# Patient Record
Sex: MALE | Race: WHITE | NOT HISPANIC OR LATINO | Employment: OTHER | URBAN - METROPOLITAN AREA
[De-identification: names, ages, dates, MRNs, and addresses within clinical notes are randomized per-mention and may not be internally consistent; named-entity substitution may affect disease eponyms.]

---

## 2019-02-03 ENCOUNTER — HOSPITAL ENCOUNTER (EMERGENCY)
Facility: HOSPITAL | Age: 29
Discharge: HOME | End: 2019-02-03
Attending: EMERGENCY MEDICINE | Admitting: EMERGENCY MEDICINE
Payer: MEDICAID

## 2019-02-03 VITALS
RESPIRATION RATE: 16 BRPM | TEMPERATURE: 98.2 F | DIASTOLIC BLOOD PRESSURE: 90 MMHG | WEIGHT: 225.31 LBS | OXYGEN SATURATION: 97 % | SYSTOLIC BLOOD PRESSURE: 147 MMHG | HEART RATE: 83 BPM

## 2019-02-03 DIAGNOSIS — R11.10 VOMITING: ICD-10-CM

## 2019-02-03 DIAGNOSIS — R23.3 PETECHIAL RASH: Primary | ICD-10-CM

## 2019-02-03 DIAGNOSIS — M25.50 JOINT PAIN: ICD-10-CM

## 2019-02-03 LAB
ALBUMIN SERPL BCP-MCNC: 4.4 G/DL (ref 3.5–5)
ALP SERPL-CCNC: 73 U/L (ref 46–116)
ALT SERPL W P-5'-P-CCNC: 55 U/L (ref 12–78)
ANION GAP SERPL CALCULATED.3IONS-SCNC: 11 MMOL/L (ref 4–13)
APTT PPP: 22 SECONDS (ref 26–38)
AST SERPL W P-5'-P-CCNC: 46 U/L (ref 5–45)
BASOPHILS # BLD AUTO: 0.05 THOUSANDS/ΜL (ref 0–0.1)
BASOPHILS NFR BLD AUTO: 1 % (ref 0–1)
BILIRUB DIRECT SERPL-MCNC: 0.2 MG/DL (ref 0–0.2)
BILIRUB SERPL-MCNC: 0.9 MG/DL (ref 0.2–1)
BUN SERPL-MCNC: 15 MG/DL (ref 5–25)
CALCIUM SERPL-MCNC: 9.1 MG/DL (ref 8.3–10.1)
CHLORIDE SERPL-SCNC: 101 MMOL/L (ref 100–108)
CO2 SERPL-SCNC: 28 MMOL/L (ref 21–32)
CREAT SERPL-MCNC: 1.1 MG/DL (ref 0.6–1.3)
EOSINOPHIL # BLD AUTO: 0.02 THOUSAND/ΜL (ref 0–0.61)
EOSINOPHIL NFR BLD AUTO: 0 % (ref 0–6)
ERYTHROCYTE [DISTWIDTH] IN BLOOD BY AUTOMATED COUNT: 12 % (ref 11.6–15.1)
GFR SERPL CREATININE-BSD FRML MDRD: 92 ML/MIN/1.73SQ M
GLUCOSE SERPL-MCNC: 121 MG/DL (ref 65–140)
HCT VFR BLD AUTO: 45 % (ref 36.5–49.3)
HGB BLD-MCNC: 15.5 G/DL (ref 12–17)
IMM GRANULOCYTES # BLD AUTO: 0.01 THOUSAND/UL (ref 0–0.2)
IMM GRANULOCYTES NFR BLD AUTO: 0 % (ref 0–2)
INR PPP: 0.93 (ref 0.86–1.17)
LACTATE SERPL-SCNC: 1.8 MMOL/L (ref 0.5–2)
LIPASE SERPL-CCNC: 111 U/L (ref 73–393)
LYMPHOCYTES # BLD AUTO: 1.67 THOUSANDS/ΜL (ref 0.6–4.47)
LYMPHOCYTES NFR BLD AUTO: 23 % (ref 14–44)
MCH RBC QN AUTO: 30.1 PG (ref 26.8–34.3)
MCHC RBC AUTO-ENTMCNC: 34.4 G/DL (ref 31.4–37.4)
MCV RBC AUTO: 87 FL (ref 82–98)
MONOCYTES # BLD AUTO: 0.49 THOUSAND/ΜL (ref 0.17–1.22)
MONOCYTES NFR BLD AUTO: 7 % (ref 4–12)
NEUTROPHILS # BLD AUTO: 5.15 THOUSANDS/ΜL (ref 1.85–7.62)
NEUTS SEG NFR BLD AUTO: 69 % (ref 43–75)
NRBC BLD AUTO-RTO: 0 /100 WBCS
PLATELET # BLD AUTO: 183 THOUSANDS/UL (ref 149–390)
PMV BLD AUTO: 10.4 FL (ref 8.9–12.7)
POTASSIUM SERPL-SCNC: 4 MMOL/L (ref 3.5–5.3)
PROT SERPL-MCNC: 7.7 G/DL (ref 6.4–8.2)
PROTHROMBIN TIME: 12.4 SECONDS (ref 11.8–14.2)
RBC # BLD AUTO: 5.15 MILLION/UL (ref 3.88–5.62)
SODIUM SERPL-SCNC: 140 MMOL/L (ref 136–145)
WBC # BLD AUTO: 7.39 THOUSAND/UL (ref 4.31–10.16)

## 2019-02-03 PROCEDURE — 85025 COMPLETE CBC W/AUTO DIFF WBC: CPT | Performed by: PHYSICIAN ASSISTANT

## 2019-02-03 PROCEDURE — 96361 HYDRATE IV INFUSION ADD-ON: CPT

## 2019-02-03 PROCEDURE — 85610 PROTHROMBIN TIME: CPT | Performed by: PHYSICIAN ASSISTANT

## 2019-02-03 PROCEDURE — 85730 THROMBOPLASTIN TIME PARTIAL: CPT | Performed by: PHYSICIAN ASSISTANT

## 2019-02-03 PROCEDURE — 36415 COLL VENOUS BLD VENIPUNCTURE: CPT | Performed by: PHYSICIAN ASSISTANT

## 2019-02-03 PROCEDURE — 96375 TX/PRO/DX INJ NEW DRUG ADDON: CPT

## 2019-02-03 PROCEDURE — 80048 BASIC METABOLIC PNL TOTAL CA: CPT | Performed by: PHYSICIAN ASSISTANT

## 2019-02-03 PROCEDURE — 80076 HEPATIC FUNCTION PANEL: CPT | Performed by: PHYSICIAN ASSISTANT

## 2019-02-03 PROCEDURE — 83690 ASSAY OF LIPASE: CPT | Performed by: PHYSICIAN ASSISTANT

## 2019-02-03 PROCEDURE — 96374 THER/PROPH/DIAG INJ IV PUSH: CPT

## 2019-02-03 PROCEDURE — 99283 EMERGENCY DEPT VISIT LOW MDM: CPT

## 2019-02-03 PROCEDURE — 83605 ASSAY OF LACTIC ACID: CPT | Performed by: PHYSICIAN ASSISTANT

## 2019-02-03 RX ORDER — METHYLPREDNISOLONE SODIUM SUCCINATE 125 MG/2ML
125 INJECTION, POWDER, LYOPHILIZED, FOR SOLUTION INTRAMUSCULAR; INTRAVENOUS ONCE
Status: COMPLETED | OUTPATIENT
Start: 2019-02-03 | End: 2019-02-03

## 2019-02-03 RX ORDER — ONDANSETRON 2 MG/ML
4 INJECTION INTRAMUSCULAR; INTRAVENOUS ONCE
Status: COMPLETED | OUTPATIENT
Start: 2019-02-03 | End: 2019-02-03

## 2019-02-03 RX ORDER — ONDANSETRON 4 MG/1
4 TABLET, ORALLY DISINTEGRATING ORAL EVERY 8 HOURS SCHEDULED
Qty: 21 TABLET | Refills: 0 | Status: SHIPPED | OUTPATIENT
Start: 2019-02-03 | End: 2019-02-10

## 2019-02-03 RX ADMIN — METHYLPREDNISOLONE SODIUM SUCCINATE 125 MG: 125 INJECTION, POWDER, FOR SOLUTION INTRAMUSCULAR; INTRAVENOUS at 17:33

## 2019-02-03 RX ADMIN — SODIUM CHLORIDE 1000 ML: 0.9 INJECTION, SOLUTION INTRAVENOUS at 17:18

## 2019-02-03 RX ADMIN — ONDANSETRON 4 MG: 2 INJECTION INTRAMUSCULAR; INTRAVENOUS at 18:16

## 2019-02-03 NOTE — ED PROVIDER NOTES
History  Chief Complaint   Patient presents with    Allergic Reaction     pt states he began with an allergiv reaction after eating and napping, NKA , has a rash on his upper body and on face as well as vomitting , feels as though tongue is swelling      Darin Simons is a 33 y o  male w PMH none significant who presents for evaluation of allergic reaction  Pt presents w girlfriend  Is concerned he is having an allergic reaction to [employer] which he ate for lunch bw 12 and 1  At home his girlfriend noted he had a rash on his face but pt was unaware  His rash is not bothering him  He doesn't have pruritus  Feels edema on L side of tongue but no trouble breathing or swallowing  No f/c sensation  He has joint pain that has been getting worse over the afternoon  Did take 25mg of benadryl after rash developed w concern for allergy  No listed known allergens  Had bowl of chicken, corn and mashed potatoes for lunch, all of which he has had before  No headache or neck pain  None       History reviewed  No pertinent past medical history  History reviewed  No pertinent surgical history  History reviewed  No pertinent family history  I have reviewed and agree with the history as documented  Social History   Substance Use Topics    Smoking status: Never Smoker    Smokeless tobacco: Never Used    Alcohol use Yes      Comment: socially        Review of Systems   Constitutional: Negative for activity change, chills, diaphoresis, fatigue and fever  HENT: Negative for congestion and rhinorrhea  Eyes: Negative for pain  Respiratory: Negative for cough, chest tightness, shortness of breath and wheezing  Cardiovascular: Negative for chest pain and palpitations  Gastrointestinal: Negative for abdominal distention, constipation, diarrhea, nausea and vomiting  Genitourinary: Negative for difficulty urinating and dysuria  Musculoskeletal: Negative for arthralgias and myalgias     Skin: Positive for rash    Neurological: Negative for dizziness, weakness, light-headedness and headaches  Psychiatric/Behavioral: The patient is not nervous/anxious  Physical Exam  Physical Exam   Constitutional: He is oriented to person, place, and time  He appears well-developed and well-nourished  No distress  HENT:   Head: Normocephalic and atraumatic  The tongue is not actually swollen, no drooling, laying back on stretcher and tolerating secretions  Posterior oropharynx appears normal  There is a purplish lesion to L side of tongue, no hemorrhagic bullae  Eyes: Pupils are equal, round, and reactive to light  Neck: Normal range of motion  Neck supple  No tracheal deviation present  No nuchal rigidity, no meningeal sx    Cardiovascular: Normal rate, regular rhythm, normal heart sounds and intact distal pulses  Exam reveals no gallop and no friction rub  No murmur heard  Pulmonary/Chest: Effort normal and breath sounds normal  No respiratory distress  He has no wheezes  He has no rales  Abdominal: Soft  Bowel sounds are normal  He exhibits no distension and no mass  There is no tenderness  There is no guarding  Musculoskeletal: He exhibits no edema or deformity  Neurological: He is alert and oriented to person, place, and time  Skin: Skin is warm and dry  He is not diaphoretic    petchichae and purpura across face, back and shoulders and chest, non painful, no excoriations  No hives  Lesions are nonblanching  Psychiatric: He has a normal mood and affect  His behavior is normal    Nursing note and vitals reviewed                  Vital Signs  ED Triage Vitals   Temperature Pulse Respirations Blood Pressure SpO2   02/03/19 1755 02/03/19 1648 02/03/19 1648 02/03/19 1648 02/03/19 1648   98 2 °F (36 8 °C) 83 16 147/90 97 %      Temp Source Heart Rate Source Patient Position - Orthostatic VS BP Location FiO2 (%)   02/03/19 1648 02/03/19 1648 02/03/19 1648 02/03/19 1648 --   Oral Monitor Lying Right arm Pain Score       --                  Vitals:    02/03/19 1648   BP: 147/90   Pulse: 83   Patient Position - Orthostatic VS: Lying       Visual Acuity      ED Medications  Medications   ondansetron (ZOFRAN) injection 4 mg (4 mg Intravenous Given 2/3/19 1816)   sodium chloride 0 9 % bolus 1,000 mL (0 mL Intravenous Stopped 2/3/19 1821)   methylPREDNISolone sodium succinate (Solu-MEDROL) injection 125 mg (125 mg Intravenous Given 2/3/19 1733)       Diagnostic Studies  Results Reviewed     Procedure Component Value Units Date/Time    Lactic acid, plasma [[id number]]  (Normal) Collected:  02/03/19 1717    Lab Status:  Final result Specimen:  Blood from Arm, Left Updated:  02/03/19 1742     LACTIC ACID 1 8 mmol/L     Narrative:         Result may be elevated if tourniquet was used during collection  Basic metabolic panel [[id number]] Collected:  02/03/19 1717    Lab Status:  Final result Specimen:  Blood from Arm, Left Updated:  02/03/19 1739     Sodium 140 mmol/L      Potassium 4 0 mmol/L      Chloride 101 mmol/L      CO2 28 mmol/L      ANION GAP 11 mmol/L      BUN 15 mg/dL      Creatinine 1 10 mg/dL      Glucose 121 mg/dL      Calcium 9 1 mg/dL      eGFR 92 ml/min/1 73sq m     Narrative:         National Kidney Disease Education Program recommendations are as follows:  GFR calculation is accurate only with a steady state creatinine  Chronic Kidney disease less than 60 ml/min/1 73 sq  meters  Kidney failure less than 15 ml/min/1 73 sq  meters      Hepatic function panel [[id number]]  (Abnormal) Collected:  02/03/19 1717    Lab Status:  Final result Specimen:  Blood from Arm, Left Updated:  02/03/19 1739     Total Bilirubin 0 90 mg/dL      Bilirubin, Direct 0 20 mg/dL      Alkaline Phosphatase 73 U/L      AST 46 (H) U/L      ALT 55 U/L      Total Protein 7 7 g/dL      Albumin 4 4 g/dL     Lipase [[id number]]  (Normal) Collected:  02/03/19 1717    Lab Status:  Final result Specimen:  Blood from Arm, Left Updated: 02/03/19 1739     Lipase 111 u/L     Protime-INR [[id number]]  (Normal) Collected:  02/03/19 1717    Lab Status:  Final result Specimen:  Blood from Arm, Left Updated:  02/03/19 1737     Protime 12 4 seconds      INR 0 93    APTT [[id number]]  (Abnormal) Collected:  02/03/19 1717    Lab Status:  Final result Specimen:  Blood from Arm, Left Updated:  02/03/19 1737     PTT 22 (L) seconds     CBC and differential [[id number]] Collected:  02/03/19 1717    Lab Status:  Final result Specimen:  Blood from Arm, Left Updated:  02/03/19 1725     WBC 7 39 Thousand/uL      RBC 5 15 Million/uL      Hemoglobin 15 5 g/dL      Hematocrit 45 0 %      MCV 87 fL      MCH 30 1 pg      MCHC 34 4 g/dL      RDW 12 0 %      MPV 10 4 fL      Platelets 706 Thousands/uL      nRBC 0 /100 WBCs      Neutrophils Relative 69 %      Immat GRANS % 0 %      Lymphocytes Relative 23 %      Monocytes Relative 7 %      Eosinophils Relative 0 %      Basophils Relative 1 %      Neutrophils Absolute 5 15 Thousands/µL      Immature Grans Absolute 0 01 Thousand/uL      Lymphocytes Absolute 1 67 Thousands/µL      Monocytes Absolute 0 49 Thousand/µL      Eosinophils Absolute 0 02 Thousand/µL      Basophils Absolute 0 05 Thousands/µL                  No orders to display              Procedures  Procedures       Phone Contacts  ED Phone Contact    ED Course                               MDM  Number of Diagnoses or Management Options  Joint pain:   Petechial rash:   Vomiting:   Diagnosis management comments: DDX includes but not ltd to: This does not look like general allergic reaction or anaphylaxis  Pt has no headache, neck stiffness, meningeal sx or nuchal rigidity to suggest neisseria meningitis  He does not look toxic, no sx of sepsis based on vitals  Concern for small vessel vasculitis  Consider henoch-shonlein purpura given n/v, joint pain and skin manifestations       Plan is to obtain:  CBC to check for anemia, leukocytosis, hydration status  Chemistry panel to check for lyte abnormalities, organ function   Lactic acid as a marker of end organ dysfunction   PT/INR and PTT to assess for coagulopathy and liver function    Based on results:  Patient w normal workup here, normal labs  No sepsis or even sirs response  Girlfriend feels rash is improving, he has not had any spread of it here  Platelets stable, stable renal function  Can follow up w PCP or rheumatology  Return if worsening of sx, or development of meningeal sx which were discussed w pt and girlfriend  LP was discussed w them but not recommended at this time given no sx of sepsis or meningeal sx  Pt does not want LP at this time  Return parameters discussed  Pt requires f/u as an outpt  Pt expresses understanding w above treatment plan  All questions answered prior to d/c  Portions of the record may have been created with voice recognition software   Occasional wrong word or "sound a like" substitutions may have occurred due to the inherent limitations of voice recognition software   Read the chart carefully and recognize, using context, where substitutions have occurred  Disposition  Final diagnoses:   Petechial rash   Vomiting   Joint pain     Time reflects when diagnosis was documented in both MDM as applicable and the Disposition within this note     Time User Action Codes Description Comment    2/3/2019  6:04 PM Clista Orchard Add [R23 3] Petechial rash     2/3/2019  6:04 PM Red Sylvia [R11 10] Vomiting     2/3/2019  6:04 PM Clista Orchard Add [M25 50] Joint pain       ED Disposition     ED Disposition Condition Date/Time Comment    Discharge  Sun Feb 3, 2019  6:04 PM Davidjulissa Randall discharge to home/self care      Condition at discharge: Good        Follow-up Information     Follow up With Specialties Details Why Contact Info Additional [address] [facility] Emergency Department Emergency Medicine  If symptoms worsen [address] [facility] [address], [address] [place], [place], [address] [place] [facility]   [address] [id number]  [phone] [facility] [place], [address], [place], [address] [facility], [address]  [phone]             There are no discharge medications for this patient  No discharge procedures on file      ED Provider  Electronically Signed by           Matt Gastelum PA-C  02/03/19 4994

## 2019-02-03 NOTE — DISCHARGE INSTRUCTIONS
Purpura   WHAT YOU NEED TO KNOW:   Purpura are purple or red spots on the skin or mucus membranes  Purpura appear when blood leaks from blood vessels and collects under the skin or mucus membrane  Purpura may be caused by any condition that causes abnormal bleeding  Treatment for purpura depends on what has caused the purpura  DISCHARGE INSTRUCTIONS:   Call 911 for any of the following:   · You have any of the following signs of a heart attack:      ¨ Squeezing, pressure, or pain in your chest that lasts longer than 5 minutes or returns    ¨ Discomfort or pain in your back, neck, jaw, stomach, or arm     ¨ Trouble breathing    ¨ Nausea or vomiting    ¨ Lightheadedness or a sudden cold sweat, especially with chest pain or trouble breathing    · You have any of the following signs of a stroke:      ¨ Numbness or drooping on one side of your face     ¨ Weakness in an arm or leg    ¨ Confusion or difficulty speaking    ¨ Dizziness, a severe headache, or vision loss  Return to the emergency department if:   · You have bleeding that does not stop or a bruise that suddenly gets bigger  · You vomit blood or material that looks like coffee grounds  · Your arm or leg looks bigger than normal and feels warm, tender, or painful  · You suddenly feel lightheaded, dizzy, or weak  Contact your healthcare provider if:   · You have bleeding from your gums, mouth, or nose  · You have irregular or heavy menstrual bleeding  · You have blood in your urine or bowel movement  · You see more bruises or red or purple spots on your skin  · You have questions or concerns about your condition or care  Self-care:   · Do not take NSAIDs, aspirin, or blood thinner medicine  These medicines can make purpura worse  Ask your healthcare provider how long you need to stop these medicines  · Protect your body from injury  Cuts or scrapes may cause bleeding that is difficult to control  Use an electric shaver  Wear gloves when you wash the dishes or garden  Be careful when you use knives or other sharp items  Always  wear a seatbelt  · Do not play contact sports  Contact sports such as football or boxing may cause injury or bleeding that is difficult to control  Ask your healthcare provider what activities are safe for you to do  · Control bleeding  Apply firm, steady pressure to cuts or scrapes  If possible, elevate the area above the level of your heart  If your nose bleeds, pinch the upper part of your nose and hold a tissue at the opening  Do this until the bleeding stops  Follow up with your healthcare provider as directed: You may need to return for more tests  Write down your questions so you remember to ask them during your visits  © 2017 [facility] [facility] Information is for End User's use only and may not be sold, redistributed or otherwise used for commercial purposes  All illustrations and images included in CareNotes® are the copyrighted property of A [employer]  or [employer]  The above information is an  only  It is not intended as medical advice for individual conditions or treatments  Talk to your doctor, nurse or pharmacist before following any medical regimen to see if it is safe and effective for you